# Patient Record
Sex: FEMALE | Race: BLACK OR AFRICAN AMERICAN | NOT HISPANIC OR LATINO | Employment: FULL TIME | URBAN - METROPOLITAN AREA
[De-identification: names, ages, dates, MRNs, and addresses within clinical notes are randomized per-mention and may not be internally consistent; named-entity substitution may affect disease eponyms.]

---

## 2024-08-14 ENCOUNTER — HOSPITAL ENCOUNTER (OUTPATIENT)
Dept: RADIOLOGY | Facility: CLINIC | Age: 39
Discharge: HOME | End: 2024-08-14
Payer: MEDICARE

## 2024-08-14 ENCOUNTER — APPOINTMENT (OUTPATIENT)
Dept: PRIMARY CARE | Facility: CLINIC | Age: 39
End: 2024-08-14
Payer: MEDICARE

## 2024-08-14 VITALS
HEART RATE: 78 BPM | OXYGEN SATURATION: 97 % | BODY MASS INDEX: 34.92 KG/M2 | SYSTOLIC BLOOD PRESSURE: 111 MMHG | DIASTOLIC BLOOD PRESSURE: 77 MMHG | HEIGHT: 65 IN | WEIGHT: 209.6 LBS

## 2024-08-14 DIAGNOSIS — E66.09 CLASS 1 OBESITY DUE TO EXCESS CALORIES WITHOUT SERIOUS COMORBIDITY WITH BODY MASS INDEX (BMI) OF 34.0 TO 34.9 IN ADULT: ICD-10-CM

## 2024-08-14 DIAGNOSIS — N63.13 MASS OF LOWER OUTER QUADRANT OF RIGHT BREAST: ICD-10-CM

## 2024-08-14 DIAGNOSIS — N63.13 MASS OF LOWER OUTER QUADRANT OF RIGHT BREAST: Primary | ICD-10-CM

## 2024-08-14 DIAGNOSIS — Z13.89 SCREENING FOR OBESITY: ICD-10-CM

## 2024-08-14 PROBLEM — E66.811 CLASS 1 OBESITY DUE TO EXCESS CALORIES WITHOUT SERIOUS COMORBIDITY WITH BODY MASS INDEX (BMI) OF 34.0 TO 34.9 IN ADULT: Status: ACTIVE | Noted: 2024-08-14

## 2024-08-14 PROCEDURE — 76642 ULTRASOUND BREAST LIMITED: CPT | Performed by: RADIOLOGY

## 2024-08-14 PROCEDURE — 1036F TOBACCO NON-USER: CPT | Performed by: INTERNAL MEDICINE

## 2024-08-14 PROCEDURE — 76642 ULTRASOUND BREAST LIMITED: CPT | Mod: RT

## 2024-08-14 PROCEDURE — 3008F BODY MASS INDEX DOCD: CPT | Performed by: INTERNAL MEDICINE

## 2024-08-14 PROCEDURE — 77066 DX MAMMO INCL CAD BI: CPT | Performed by: RADIOLOGY

## 2024-08-14 PROCEDURE — 77062 BREAST TOMOSYNTHESIS BI: CPT

## 2024-08-14 PROCEDURE — 76982 USE 1ST TARGET LESION: CPT | Mod: RT

## 2024-08-14 PROCEDURE — 99202 OFFICE O/P NEW SF 15 MIN: CPT | Performed by: INTERNAL MEDICINE

## 2024-08-14 PROCEDURE — 77062 BREAST TOMOSYNTHESIS BI: CPT | Performed by: RADIOLOGY

## 2024-08-14 ASSESSMENT — PATIENT HEALTH QUESTIONNAIRE - PHQ9
2. FEELING DOWN, DEPRESSED OR HOPELESS: NOT AT ALL
2. FEELING DOWN, DEPRESSED OR HOPELESS: NOT AT ALL
SUM OF ALL RESPONSES TO PHQ9 QUESTIONS 1 AND 2: 0
1. LITTLE INTEREST OR PLEASURE IN DOING THINGS: NOT AT ALL
SUM OF ALL RESPONSES TO PHQ9 QUESTIONS 1 AND 2: 0
1. LITTLE INTEREST OR PLEASURE IN DOING THINGS: NOT AT ALL

## 2024-08-14 ASSESSMENT — ENCOUNTER SYMPTOMS: CONSTITUTIONAL NEGATIVE: 1

## 2024-08-14 NOTE — PROGRESS NOTES
"Patient ID: Lizzy Loyd is a 39 y.o. female who presents for New Patient Visit (Lump in right breast ).    /77   Pulse 78   Ht 1.651 m (5' 5\")   Wt 95.1 kg (209 lb 9.6 oz)   SpO2 97%   BMI 34.88 kg/m²     HPI      THIS IS NEW PT VISIT       CONCERNED NOTED LUMP RT BREAST  NO PAIN ,  NO NIPPLE DISCHARGE   NOTED LUMP RT BREAST DURATION   APPRXM 6 MONTHS , NOT AGGRAVATED DURING   MENSTRUAL PERIOD , I AM ON IUD     SHE IS MOTHER OF 2 CHILDREN   2 BOYS 18 AND 16 YEARS OLD       FHX  OF CA BREAST YES   MATERNAL AUNT  DIAGNOSED WITH CA BREAST   AT YOUNG AGE     I DO SMOKE CANNABIS     I DRINK WINE               Subjective     Review of Systems   Constitutional: Negative.    All other systems reviewed and are negative.      Objective     BREAST EXAMINED IN PRESENCE OF CHAPERON   MATTI REILLY AND WITH PT'S CONSENT     Physical Exam  Vitals and nursing note reviewed. Exam conducted with a chaperone present.   Neck:      Vascular: No carotid bruit.   Cardiovascular:      Rate and Rhythm: Normal rate and regular rhythm.      Pulses: Normal pulses.      Heart sounds: Normal heart sounds. No murmur heard.  Pulmonary:      Effort: Pulmonary effort is normal.      Breath sounds: Normal breath sounds.   Chest:      Chest wall: No mass, deformity, tenderness or edema.   Breasts:     Right: Mass present. No inverted nipple, nipple discharge or skin change.      Comments: RT BREAST LOWER QUADRANT   FEW WELL DEFINED LUMPS NOTED   Lymphadenopathy:      Upper Body:      Right upper body: No axillary or pectoral adenopathy.      Left upper body: No axillary or pectoral adenopathy.         No results found for: \"WBC\", \"HGB\", \"HCT\", \"MCV\", \"PLT\"        Problem List Items Addressed This Visit       Class 1 obesity due to excess calories without serious comorbidity with body mass index (BMI) of 34.0 to 34.9 in adult    Screening for obesity     Other Visit Diagnoses       Mass of lower outer quadrant of right breast  "   -  Primary    Relevant Orders    BI US breast complete right              A/P         DIAGNOSTIC ULTRASOUND RT BREAST   WILL FOLLOW ULTRASOUND RESULT

## 2024-08-25 DIAGNOSIS — N63.11 MASS OF UPPER OUTER QUADRANT OF RIGHT BREAST: Primary | ICD-10-CM

## 2024-12-05 ENCOUNTER — APPOINTMENT (OUTPATIENT)
Dept: PRIMARY CARE | Facility: CLINIC | Age: 39
End: 2024-12-05
Payer: MEDICARE

## 2025-04-23 ENCOUNTER — HOSPITAL ENCOUNTER (EMERGENCY)
Facility: HOSPITAL | Age: 40
Discharge: HOME | End: 2025-04-24
Payer: COMMERCIAL

## 2025-04-23 DIAGNOSIS — M54.2 NECK PAIN ON RIGHT SIDE: Primary | ICD-10-CM

## 2025-04-23 PROBLEM — Z97.5 IUD (INTRAUTERINE DEVICE) IN PLACE: Status: ACTIVE | Noted: 2017-03-15

## 2025-04-23 PROCEDURE — 99283 EMERGENCY DEPT VISIT LOW MDM: CPT

## 2025-04-23 RX ORDER — PREDNISONE 20 MG/1
20 TABLET ORAL ONCE
Status: COMPLETED | OUTPATIENT
Start: 2025-04-23 | End: 2025-04-24

## 2025-04-23 RX ORDER — PREDNISONE 20 MG/1
20 TABLET ORAL DAILY
Qty: 5 TABLET | Refills: 0 | Status: SHIPPED | OUTPATIENT
Start: 2025-04-23

## 2025-04-23 RX ORDER — IBUPROFEN 600 MG/1
600 TABLET ORAL EVERY 6 HOURS PRN
Qty: 20 TABLET | Refills: 0 | Status: SHIPPED | OUTPATIENT
Start: 2025-04-23

## 2025-04-23 RX ORDER — TRAMADOL HYDROCHLORIDE 50 MG/1
50 TABLET ORAL ONCE
Status: COMPLETED | OUTPATIENT
Start: 2025-04-23 | End: 2025-04-24

## 2025-04-23 RX ORDER — IBUPROFEN 600 MG/1
600 TABLET ORAL ONCE
Status: COMPLETED | OUTPATIENT
Start: 2025-04-23 | End: 2025-04-24

## 2025-04-23 RX ORDER — ORPHENADRINE CITRATE 100 MG/1
100 TABLET, EXTENDED RELEASE ORAL 2 TIMES DAILY PRN
Qty: 10 TABLET | Refills: 0 | Status: SHIPPED | OUTPATIENT
Start: 2025-04-23

## 2025-04-23 ASSESSMENT — PAIN - FUNCTIONAL ASSESSMENT: PAIN_FUNCTIONAL_ASSESSMENT: 0-10

## 2025-04-23 ASSESSMENT — PAIN SCALES - GENERAL: PAINLEVEL_OUTOF10: 0 - NO PAIN

## 2025-04-23 ASSESSMENT — COLUMBIA-SUICIDE SEVERITY RATING SCALE - C-SSRS
2. HAVE YOU ACTUALLY HAD ANY THOUGHTS OF KILLING YOURSELF?: NO
6. HAVE YOU EVER DONE ANYTHING, STARTED TO DO ANYTHING, OR PREPARED TO DO ANYTHING TO END YOUR LIFE?: NO
1. IN THE PAST MONTH, HAVE YOU WISHED YOU WERE DEAD OR WISHED YOU COULD GO TO SLEEP AND NOT WAKE UP?: NO

## 2025-04-23 NOTE — Clinical Note
Lizzy Loyd was seen and treated in our emergency department on 4/23/2025.  She may return to work on 04/26/2025.       If you have any questions or concerns, please don't hesitate to call.      Susan Gonzalez PA-C

## 2025-04-24 VITALS
WEIGHT: 213.85 LBS | HEART RATE: 75 BPM | OXYGEN SATURATION: 98 % | HEIGHT: 65 IN | TEMPERATURE: 97.9 F | RESPIRATION RATE: 15 BRPM | BODY MASS INDEX: 35.63 KG/M2 | SYSTOLIC BLOOD PRESSURE: 116 MMHG | DIASTOLIC BLOOD PRESSURE: 75 MMHG

## 2025-04-24 PROCEDURE — 2500000001 HC RX 250 WO HCPCS SELF ADMINISTERED DRUGS (ALT 637 FOR MEDICARE OP): Performed by: PHYSICIAN ASSISTANT

## 2025-04-24 PROCEDURE — 2500000004 HC RX 250 GENERAL PHARMACY W/ HCPCS (ALT 636 FOR OP/ED): Performed by: PHYSICIAN ASSISTANT

## 2025-04-24 RX ADMIN — PREDNISONE 20 MG: 20 TABLET ORAL at 00:02

## 2025-04-24 RX ADMIN — IBUPROFEN 600 MG: 600 TABLET ORAL at 00:02

## 2025-04-24 RX ADMIN — TRAMADOL HYDROCHLORIDE 50 MG: 50 TABLET, COATED ORAL at 00:03

## 2025-04-24 NOTE — DISCHARGE INSTRUCTIONS
Please continue Tylenol 1 g every 4-6 hours and/or ibuprofen 600 mg every 6-8 hours as needed for pain.  Begin prednisone daily x 5 days.  May take Norflex every 12 hours as needed for muscle pain or spasms.  This is a muscle relaxer and can cause drowsiness.  Do not drive, drink alcohol or operate heavy machinery while taking this medication.  Follow-up with primary care doctor in 2 to 5 days.    Please return to ER for any new or worsening symptoms.

## 2025-04-24 NOTE — ED TRIAGE NOTES
Pt to ER with right-sided neck pain that comes and goes that started two days ago . Pt denies injury. Pt last took tylenol around 1830 today.

## 2025-04-24 NOTE — ED PROVIDER NOTES
"Chief Complaint   Patient presents with    Neck Pain     HPI:   Lizzy Loyd is an 40 y.o. female who presents to the ED with  for evaluation of right sided neck pain x 2 days.  She localizes it to the angle of the mandible.  Radiates down to her toes.  Occasionally radiates across her face.  She rates it \"greater than 10/10\".  She denies any trauma or injury.  Does note that she has been working out almost daily for the past 40 days but does not remember stretching or straining recently.  She took 1 g of Tylenol at 1845 and a second gram of Tylenol sometime this morning with no relief.  Denies numbness, tingling, muscle weakness, dizziness or lightheadedness, otorrhea, ear pain, sore throat, stiffness, headaches, fever, chills, vision changes.  IUD and does not have normal menses    Medications: Denies any  Soc HX:  RX Allergies[1]: NKDA    Physical Exam  Vitals and nursing note reviewed.   Constitutional:       General: She is not in acute distress.     Appearance: Normal appearance. She is not ill-appearing or toxic-appearing.   HENT:      Head: Normocephalic and atraumatic.      Right Ear: Tympanic membrane, ear canal and external ear normal.      Left Ear: Tympanic membrane, ear canal and external ear normal.      Nose: Nose normal.      Mouth/Throat:      Mouth: Mucous membranes are moist.   Eyes:      Extraocular Movements: Extraocular movements intact.      Pupils: Pupils are equal, round, and reactive to light.   Neck:     Cardiovascular:      Rate and Rhythm: Normal rate and regular rhythm.      Pulses: Normal pulses.      Heart sounds: Normal heart sounds.   Pulmonary:      Effort: Pulmonary effort is normal. No respiratory distress.      Breath sounds: Normal breath sounds.   Musculoskeletal:         General: Normal range of motion.      Cervical back: Normal range of motion. Tenderness (Near right angle of the mandible) present.   Skin:     General: Skin is warm and dry.      Capillary " Refill: Capillary refill takes less than 2 seconds.   Neurological:      Mental Status: She is alert.      Cranial Nerves: No cranial nerve deficit.     VS: As documented in the triage note and EMR flowsheet from this visit were reviewed.      Medical Decision Making:   ED Course as of 04/24/25 0236   Wed Apr 23, 2025   2302 Vitals Reviewed: Afebrile. Normotensive. Not tachycardic nor tachypneic. No hypoxia.   [KA]   Thu Apr 24, 2025   0234 Patient is a 40-year-old female who presents to the ED for evaluation of neck pain.  She is worried that it is not infection.  She is not complaining of any infectious symptoms and no findings on clinical exam to support infectious etiology.  She has tenderness with palpation of the neck right behind the angle of the mandible on the right-hand side.  No otorrhea, mastoid tenderness or swelling.  No otitis media.  Oropharynx is patent with no tonsillar edema nor exudate.  She has normal range of motion of the neck without meningismus.  No fever or headache to suggest meningitis workup is necessary.  No dental pain to suggest oral infection.  Lungs are clear.  Provided reassurance.  Will give patient prednisone, Motrin and tramadol in the ED.  Viewed [facility] with no concerning findings.  Will send home with short course of steroids and tramadol.  Advised follow-up with PCP and patient says she does not have PCP that she can get into see until the end of May.  Advised if she does not have improvement after a few days of supportive care she can return to ED.  Patient agreeable.  Work note provided. [KA]      ED Course User Index  [KA] Susan Gonzalez PA-C         Diagnoses as of 04/24/25 0236   Neck pain on right side      Escalation of Care: Appropriate for outpatient management     Counseling: Spoke with the patient and discussed today´s findings, in addition to providing specific details for the plan of care and expected course.  Patient was given the opportunity to ask  questions.    Discussed return precautions and importance of follow-up.  Advised to follow-up with PCP.  Advised to return to the ED for changing or worsening symptoms, new symptoms, complaint specific precautions, and precautions listed on the discharge paperwork.  Educated on the common potential side effects of medications prescribed.    I advised the patient that the emergency evaluation and treatment provided today doesn't end their need for medical care. It is very important that they follow-up with their primary care provider or other specialist as instructed.    The plan of care was mutually agreed upon with the patient. The patient and/or family were given the opportunity to ask questions. All questions asked today in the ED were answered to the best of my ability with today's information.    I specifically advised the patient to return to the ED for changing or worsening symptoms, worrisome new symptoms, or for any complaint specific precautions listed on the discharge paperwork.    This patient was cared for in the setting of nationwide stress on resources and staffing.    This report was transcribed using voice recognition software.  Every effort was made to ensure accuracy, however, inadvertently computerized transcription errors may be present.         [1]   Allergies  Allergen Reactions    Chicken-Peas-Carrots [Nutritional Supplements] Agitation        Susan Gonzalez PA-C  04/24/25 9912